# Patient Record
Sex: FEMALE | Race: WHITE | NOT HISPANIC OR LATINO | URBAN - NONMETROPOLITAN AREA
[De-identification: names, ages, dates, MRNs, and addresses within clinical notes are randomized per-mention and may not be internally consistent; named-entity substitution may affect disease eponyms.]

---

## 2019-01-22 ENCOUNTER — IMPORTED ENCOUNTER (OUTPATIENT)
Dept: URBAN - NONMETROPOLITAN AREA CLINIC 1 | Facility: CLINIC | Age: 54
End: 2019-01-22

## 2019-01-22 PROBLEM — H52.03: Noted: 2019-01-22

## 2019-01-22 PROBLEM — H52.4: Noted: 2019-01-22

## 2019-01-22 PROBLEM — H35.033: Noted: 2019-01-22

## 2019-01-22 PROBLEM — H52.221: Noted: 2019-01-22

## 2019-01-22 PROCEDURE — S0620 ROUTINE OPHTHALMOLOGICAL EXA: HCPCS

## 2019-01-22 NOTE — PATIENT DISCUSSION
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

## 2022-04-09 ASSESSMENT — VISUAL ACUITY
OS_SC: 20/25
OU_SC: 20/20-1
OD_SC: 20/25-

## 2022-04-09 ASSESSMENT — TONOMETRY
OD_IOP_MMHG: 20
OS_IOP_MMHG: 21

## 2023-01-10 ENCOUNTER — COMPREHENSIVE EXAM (OUTPATIENT)
Dept: URBAN - NONMETROPOLITAN AREA CLINIC 4 | Facility: CLINIC | Age: 58
End: 2023-01-10

## 2023-01-10 DIAGNOSIS — H52.4: ICD-10-CM

## 2023-01-10 PROCEDURE — S0620 ROUTINE OPHTHALMOLOGICAL EXA: HCPCS

## 2023-01-10 ASSESSMENT — VISUAL ACUITY
OU_CC: 20/20
OS_CC: 20/25-2
OU_CC: J1+
OD_CC: 20/25

## 2023-01-10 ASSESSMENT — TONOMETRY
OD_IOP_MMHG: 20
OS_IOP_MMHG: 20

## 2023-01-10 NOTE — PATIENT DISCUSSION
discussed findings w/patient. previously had Malignant HTN and was sent to ER from appt here. much better controlled now. continue to monitor yearly or prn.

## 2023-01-10 NOTE — PATIENT DISCUSSION
Behcet's Disease discussed findings w/patient.  no ocular manifestations at this time.  continue to monitor yearly.

## 2025-04-28 ENCOUNTER — COMPREHENSIVE EXAM (OUTPATIENT)
Age: 60
End: 2025-04-28

## 2025-04-28 DIAGNOSIS — H52.03: ICD-10-CM

## 2025-04-28 DIAGNOSIS — M35.2: ICD-10-CM

## 2025-04-28 DIAGNOSIS — H52.4: ICD-10-CM

## 2025-04-28 DIAGNOSIS — H35.033: ICD-10-CM

## 2025-04-28 DIAGNOSIS — H25.13: ICD-10-CM

## 2025-04-28 DIAGNOSIS — H35.3131: ICD-10-CM

## 2025-04-28 PROCEDURE — 92134 CPTRZ OPH DX IMG PST SGM RTA: CPT

## 2025-04-28 PROCEDURE — S0621AEC ROUTINE OPH EXAM INCLUDES REF/ EST PATIENT
